# Patient Record
Sex: FEMALE | Race: WHITE
[De-identification: names, ages, dates, MRNs, and addresses within clinical notes are randomized per-mention and may not be internally consistent; named-entity substitution may affect disease eponyms.]

---

## 2022-10-11 LAB
BASOPHILS # BLD AUTO: 0.04 K/MM3 (ref 0–0.23)
BASOPHILS NFR BLD AUTO: 1 % (ref 0–2)
DEPRECATED RDW RBC AUTO: 44.3 FL (ref 35.1–46.3)
EOSINOPHIL # BLD AUTO: 0.04 K/MM3 (ref 0–0.68)
EOSINOPHIL NFR BLD AUTO: 1 % (ref 0–6)
ERYTHROCYTE [DISTWIDTH] IN BLOOD BY AUTOMATED COUNT: 13.5 % (ref 11.7–14.2)
HCT VFR BLD AUTO: 41.3 % (ref 33–51)
HGB BLD-MCNC: 13.6 G/DL (ref 11.5–16)
IMM GRANULOCYTES # BLD AUTO: 0.01 K/MM3 (ref 0–0.1)
IMM GRANULOCYTES NFR BLD AUTO: 0 % (ref 0–1)
LYMPHOCYTES # BLD AUTO: 1.86 K/MM3 (ref 0.84–5.2)
LYMPHOCYTES NFR BLD AUTO: 26 % (ref 21–46)
MCHC RBC AUTO-ENTMCNC: 32.9 G/DL (ref 31.5–36.5)
MCV RBC AUTO: 90 FL (ref 80–100)
MONOCYTES # BLD AUTO: 0.36 K/MM3 (ref 0.16–1.47)
MONOCYTES NFR BLD AUTO: 5 % (ref 4–13)
NEUTROPHILS # BLD AUTO: 4.88 K/MM3 (ref 1.96–9.15)
NEUTROPHILS NFR BLD AUTO: 68 % (ref 41–73)
NRBC # BLD AUTO: 0 K/MM3 (ref 0–0.02)
NRBC BLD AUTO-RTO: 0 /100 WBC (ref 0–0.2)
PLATELET # BLD AUTO: 277 K/MM3 (ref 150–400)

## 2022-10-13 ENCOUNTER — HOSPITAL ENCOUNTER (OUTPATIENT)
Dept: HOSPITAL 95 - ORSCMMR | Age: 52
Discharge: HOME | End: 2022-10-13
Attending: OBSTETRICS & GYNECOLOGY
Payer: OTHER GOVERNMENT

## 2022-10-13 VITALS — HEIGHT: 66 IN | BODY MASS INDEX: 25.33 KG/M2 | WEIGHT: 157.63 LBS

## 2022-10-13 DIAGNOSIS — D25.9: ICD-10-CM

## 2022-10-13 DIAGNOSIS — N92.0: Primary | ICD-10-CM

## 2022-10-13 DIAGNOSIS — N84.0: ICD-10-CM

## 2022-10-13 DIAGNOSIS — D50.0: ICD-10-CM

## 2022-10-13 DIAGNOSIS — N94.6: ICD-10-CM

## 2022-10-13 PROCEDURE — A9270 NON-COVERED ITEM OR SERVICE: HCPCS

## 2022-10-13 PROCEDURE — 0UT94ZZ RESECTION OF UTERUS, PERCUTANEOUS ENDOSCOPIC APPROACH: ICD-10-PCS | Performed by: OBSTETRICS & GYNECOLOGY

## 2022-10-13 PROCEDURE — 58573 TLH W/T/O UTERUS OVER 250 G: CPT

## 2022-10-13 PROCEDURE — 0UT74ZZ RESECTION OF BILATERAL FALLOPIAN TUBES, PERCUTANEOUS ENDOSCOPIC APPROACH: ICD-10-PCS | Performed by: OBSTETRICS & GYNECOLOGY

## 2022-10-13 NOTE — NUR
DISCHARGE INSTRUCTIONS REVIEWED AND SIGNED. PT FEELS WELL AND DESIRES TO GO
HOME. UP TO W/C. DISCHARGE TO HOME WITH .

## 2022-10-13 NOTE — NUR
Ambulatory in [facility] warming gown applied.  Surgical site
prepped with 2% Chlorhexidine cloth wipe.  History, Chart, Medications and
Allergies reviewed before start of procedure.Lungs clear T/O to Auscultation.
Patient confirms NPO status and agrees with scheduled surgery.  Pre-Op
teaching done. Pt verbalizes understanding.  Patient States Post-Procedure
ride home has been arranged.  Patient reports completing Chlorhexadine shower
X2 prior to admission to hospital.